# Patient Record
Sex: MALE | Race: WHITE
[De-identification: names, ages, dates, MRNs, and addresses within clinical notes are randomized per-mention and may not be internally consistent; named-entity substitution may affect disease eponyms.]

---

## 2018-03-22 ENCOUNTER — HOSPITAL ENCOUNTER (EMERGENCY)
Dept: HOSPITAL 63 - ER | Age: 28
Discharge: HOME | End: 2018-03-22
Payer: COMMERCIAL

## 2018-03-22 VITALS — DIASTOLIC BLOOD PRESSURE: 76 MMHG | SYSTOLIC BLOOD PRESSURE: 134 MMHG

## 2018-03-22 VITALS — HEIGHT: 69 IN | BODY MASS INDEX: 32.16 KG/M2 | WEIGHT: 217.16 LBS

## 2018-03-22 DIAGNOSIS — F10.129: Primary | ICD-10-CM

## 2018-03-22 PROCEDURE — 99283 EMERGENCY DEPT VISIT LOW MDM: CPT

## 2018-03-22 NOTE — PHYS DOC
Adult General


Chief Complaint


Chief Complaint:  ALCOHOL INTOXICATION





HPI


HPI





Patient is a 27-year-old gentleman who came into the ER today by EMS secondary 

to alcohol intoxication. Per patient's report he had approximately 8-15 shots 

of tequila at [place] [employer] as well as eating multiple rounds of hot and 

spicy [employer]. Patient reports he had an episode of emesis of his friend'

s car. Patient is currently without any complaints and is requesting to be 

discharged home. Patient has any fevers shakes chills nausea vomiting diarrhea 

chest pain shortness breath cough cold rhinorrhea. Patient reports he has no 

history of hypertension diabetes lung liver or kidney pals. Patient reports he 

does drink on weekends heavily.





Review of systems:





Constitutional: Denies fever or chills 


Eyes: Denies change in visual acuity, redness, or eye pain 


HENT: Denies nasal congestion or sore throat 


All other review systems are negative except as documented in the history of 

present illness portion.





Physical exam:





Constitutional: Well developed, well nourished, no acute distress, non-toxic 

appearance. 


HENT: Normocephalic, atraumatic, bilateral external ears normal, nose normal. 


Eyes:  EOMI, conjunctiva normal, no discharge.  


Neck: Normal range of motion, no tenderness, supple, no stridor.  


Cardiovascular:Heart rate regular rhythm


Lungs & Thorax:  Bilateral breath sounds clear to auscultation no respiratory 

distress


Abdomen: Bowel sounds normal, soft, no tenderness, no masses, no pulsatile 

masses.  


Skin: Warm, dry, no erythema, no rash.  


Back: No tenderness, no CVA tenderness.  


Extremities: No tenderness, no cyanosis, no clubbing, ROM intact, no edema.  


Neurologic: Alert and oriented X 3, normal motor function, normal sensory 

function, no focal deficits noted. 


Psychologic: Affect normal, judgement normal, mood normal. 





Assessment and plan





This is a 27-year-old pleasantly intoxicated gentleman who presents here today 

secondary to intoxication [employer] drugs. Patient is currently clinically 

and hemodynamically stable. Patient's vital signs are all within normal limits. 

Patient's alert awake and oriented 3 pleasant and interactive. Patient does 

have some slight slurring of speech which is consistent with alcohol 

intoxication. Patient has requested that he call family so that he go home and 

sleep off his intoxication. Patient's family did arrive and they are speaking 

to him and they feel very comfortable with the plan to take him home. They have 

no concerns at this time and they do not think that he needs any further ER 

evaluation.


Shared decision making with the patient and his family agreed to discharge him 

home under their supervision. Precautions were reviewed with the family 

including the need to make sure he is monitored throughout the night for 

episodes of emesis or alteration in his mentation. Patient denies any drug use. 

Family concurs with this statement.





Allergies


Allergies





Allergies








Coded Allergies Type Severity Reaction Last Updated Verified


 


  No Known Drug Allergies    3/22/18 No











EKG


EKG


[]





Radiology/Procedures


Radiology/Procedures


[]





Course & Med Decision Making


Course & Med Decision Making


Pertinent Labs and Imaging studies reviewed. (See chart for details)





[]





Dragon Disclaimer


Dragon Disclaimer


This electronic medical record was generated, in whole or in part, using a 

voice recognition dictation system.





Departure


Departure:


Impression:  


 Primary Impression:  


 Alcohol intoxication


Disposition:  01 HOME, SELF-CARE


Condition:  STABLE


Patient Instructions:  Alcohol Intoxication











LOREN NEWTON MD Mar 22, 2018 23:17